# Patient Record
Sex: MALE | Race: WHITE | NOT HISPANIC OR LATINO | URBAN - METROPOLITAN AREA
[De-identification: names, ages, dates, MRNs, and addresses within clinical notes are randomized per-mention and may not be internally consistent; named-entity substitution may affect disease eponyms.]

---

## 2022-12-14 ENCOUNTER — OFFICE (OUTPATIENT)
Dept: URBAN - METROPOLITAN AREA CLINIC 19 | Facility: CLINIC | Age: 27
End: 2022-12-14

## 2022-12-14 VITALS
HEART RATE: 87 BPM | DIASTOLIC BLOOD PRESSURE: 66 MMHG | HEIGHT: 70 IN | OXYGEN SATURATION: 97 % | SYSTOLIC BLOOD PRESSURE: 112 MMHG | WEIGHT: 152 LBS

## 2022-12-14 DIAGNOSIS — K52.9 NONINFECTIVE GASTROENTERITIS AND COLITIS, UNSPECIFIED: ICD-10-CM

## 2022-12-14 PROCEDURE — 99203 OFFICE O/P NEW LOW 30 MIN: CPT | Performed by: INTERNAL MEDICINE

## 2022-12-14 NOTE — SERVICENOTES
I, Ronald Ayers MD, independently interviewed and examined this patient and made modifications to the final HPI, exam, impression, and plan as initially scribed by Erica Hamlin NP.

## 2022-12-14 NOTE — SERVICEHPINOTES
Mr. Jaramillo is a 27 year-old male with no PMH.rufino Barnett presents to clinic today with c/o chronic diarrhea. 
br
br Onset of diarrhea around 2015, no worse or better. Typically postprandial, worse after dinner. Spicy foods and meat make this worse. Describes as watery stool. Sometimes he will also have hard stools. He has a BM 4-5x's per day at least four or five times per week. He does have some days without diarrhea. No melena, weight loss, hematochezia, N/V, anorexia, AP. br
br He does have some heartburn, usually with drinking beer or sodas. Does not take anything for reflux. He does travel frequently, went to [place] recently.br brNocturnal symptoms- nonebrRectal bleeding- nonebrWeight loss- nonebrLactose or gluten rich foods- no issues he is aware of brFamily history of colon cancer or IBD- nonebrIncontinence- nonebrNew medicines or change in medication doses- Recently started Imodium a couple days agobrOver-the-counter medicines- No NSAID use, no herbals or supplements
br No well watersbr
Anibal has never seen a GI MD before. 
br He still has his gallbladder.br rufino

## 2022-12-19 LAB
ALPHA-GAL IGE PANEL: CLASS DESCRIPTION: (no result)
ALPHA-GAL IGE PANEL: F026-IGE PORK: <0.1 KU/L
ALPHA-GAL IGE PANEL: F027-IGE BEEF: <0.1 KU/L
ALPHA-GAL IGE PANEL: F088-IGE LAMB: <0.1 KU/L
ALPHA-GAL IGE PANEL: IMMUNOGLOBULIN E, TOTAL: 211 IU/ML (ref 6–495)
ALPHA-GAL IGE PANEL: O215-IGE ALPHA-GAL: <0.1 KU/L
C-REACTIVE PROTEIN, QUANT: <1 MG/L
CBC, PLATELET, NO DIFFERENTIAL: HEMATOCRIT: 43.8 % (ref 37.5–51)
CBC, PLATELET, NO DIFFERENTIAL: HEMOGLOBIN: 15.1 G/DL (ref 13–17.7)
CBC, PLATELET, NO DIFFERENTIAL: MCH: 30.3 PG (ref 26.6–33)
CBC, PLATELET, NO DIFFERENTIAL: MCHC: 34.5 G/DL (ref 31.5–35.7)
CBC, PLATELET, NO DIFFERENTIAL: MCV: 88 FL (ref 79–97)
CBC, PLATELET, NO DIFFERENTIAL: NRBC: (no result)
CBC, PLATELET, NO DIFFERENTIAL: PLATELETS: 143 X10E3/UL — LOW (ref 150–450)
CBC, PLATELET, NO DIFFERENTIAL: RBC: 4.98 X10E6/UL (ref 4.14–5.8)
CBC, PLATELET, NO DIFFERENTIAL: RDW: 13 % (ref 11.6–15.4)
CBC, PLATELET, NO DIFFERENTIAL: WBC: 3.9 X10E3/UL (ref 3.4–10.8)
CELIAC DISEASE COMPREHENSIVE: DEAMIDATED GLIADIN ABS, IGA: 12 UNITS (ref 0–19)
CELIAC DISEASE COMPREHENSIVE: DEAMIDATED GLIADIN ABS, IGG: 23 UNITS — HIGH (ref 0–19)
CELIAC DISEASE COMPREHENSIVE: ENDOMYSIAL ANTIBODY IGA: NEGATIVE
CELIAC DISEASE COMPREHENSIVE: IMMUNOGLOBULIN A, QN, SERUM: 279 MG/DL (ref 90–386)
CELIAC DISEASE COMPREHENSIVE: T-TRANSGLUTAMINASE (TTG) IGA: <2 U/ML
CELIAC DISEASE COMPREHENSIVE: T-TRANSGLUTAMINASE (TTG) IGG: <2 U/ML
COMP. METABOLIC PANEL (14): A/G RATIO: 1.9 (ref 1.2–2.2)
COMP. METABOLIC PANEL (14): ALBUMIN: 4.6 G/DL (ref 4.1–5.2)
COMP. METABOLIC PANEL (14): ALKALINE PHOSPHATASE: 49 IU/L (ref 44–121)
COMP. METABOLIC PANEL (14): ALT (SGPT): 8 IU/L (ref 0–44)
COMP. METABOLIC PANEL (14): AST (SGOT): 11 IU/L (ref 0–40)
COMP. METABOLIC PANEL (14): BILIRUBIN, TOTAL: 0.5 MG/DL (ref 0–1.2)
COMP. METABOLIC PANEL (14): BUN/CREATININE RATIO: 11 (ref 9–20)
COMP. METABOLIC PANEL (14): BUN: 10 MG/DL (ref 6–20)
COMP. METABOLIC PANEL (14): CALCIUM: 9.4 MG/DL (ref 8.7–10.2)
COMP. METABOLIC PANEL (14): CARBON DIOXIDE, TOTAL: 26 MMOL/L (ref 20–29)
COMP. METABOLIC PANEL (14): CHLORIDE: 101 MMOL/L (ref 96–106)
COMP. METABOLIC PANEL (14): CREATININE: 0.89 MG/DL (ref 0.76–1.27)
COMP. METABOLIC PANEL (14): EGFR: 121 ML/MIN/1.73 (ref 59–?)
COMP. METABOLIC PANEL (14): GLOBULIN, TOTAL: 2.4 G/DL (ref 1.5–4.5)
COMP. METABOLIC PANEL (14): GLUCOSE: 68 MG/DL — LOW (ref 70–99)
COMP. METABOLIC PANEL (14): POTASSIUM: 4.4 MMOL/L (ref 3.5–5.2)
COMP. METABOLIC PANEL (14): PROTEIN, TOTAL: 7 G/DL (ref 6–8.5)
COMP. METABOLIC PANEL (14): SODIUM: 140 MMOL/L (ref 134–144)
TSH: 1.06 UIU/ML (ref 0.45–4.5)